# Patient Record
(demographics unavailable — no encounter records)

---

## 2024-11-20 NOTE — PROCEDURE
[FreeTextEntry6] : Nasal Endoscopy: Reason for nasal endoscopy: anterior rhinoscopy insufficient to account for symptoms.  Flexible scope #24 was used. Right nasal passage with normal inferior, middle and superior turbinates. Nasal passage patent with widely patent max antrostomy with quiescent mucosa. Well healed without any areas suspicious for IP. Clear sphenoethmoid recess. Ethmoids clear. Left nasal passage with normal inferior, middle and superior turbinates. Nasal passage was patent with tiny polyp at posterior middle meatus and sphenoethmoid recess.  Nasopharynx clear.

## 2024-11-20 NOTE — HISTORY OF PRESENT ILLNESS
[de-identified] : 71 y/o M s/p FESS and IP removal on 3/5/2024.  He notes doing well.  No bleeding or d/c from the nose.  No congestion.  Sense of smell is good.  Occasionally using sinus wash. had thyroid us which showed left thyroid nodule and biopsy 9/12 results THYROID, LEFT LOWER, US GUIDED FNA: BENIGN FINDINGS  (Category II).Consistent with a nodular goiter.

## 2024-11-20 NOTE — ASSESSMENT
[FreeTextEntry1] : S/p FESS and Removal of IP.  Well healed.  No evidence of IP on exam today: - post-resection baseline MRI looked clear although thyroid enlargement noted so thyroid us ordered and had left thyroid nodule with FNA that was benign - yearly thyroid us - F/U 3 months for endoscopic surveillance

## 2025-02-07 NOTE — PROCEDURE
[FreeTextEntry6] : Nasal Endoscopy: Reason for nasal endoscopy: anterior rhinoscopy insufficient to account for symptoms.  Flexible scope #24 was used. Right nasal passage with normal inferior, middle and superior turbinates. Nasal passage patent with widely patent with thick purulent mucus in inferior max antrostomy with quiescent mucosa. Well healed without any areas suspicious for IP. Clear sphenoethmoid recess. Ethmoids clear. Left nasal passage with normal inferior, middle and superior turbinates. Nasal passage was patent and clear at the middle meatus and sphenoethmoid recess.  Nasopharynx clear.  Rigid scope used to suction the purulence and culture taken.

## 2025-02-07 NOTE — HISTORY OF PRESENT ILLNESS
[de-identified] : 72 y/o M s/p FESS and IP removal on 3/5/2024.  He notes doing well.  No bleeding or d/c from the nose.  No congestion.  Sense of smell is good.  Occasionally using sinus wash. had thyroid us which showed left thyroid nodule and biopsy 9/12 results THYROID, LEFT LOWER, US GUIDED FNA: BENIGN FINDINGS  (Category II).Consistent with a nodular goiter.  [FreeTextEntry1] : He is here for f/u. He states he has been having to clear his nose a lot. He has been having nasal secretions. He has no nasal congestion. He has not been using any nasal saline rinses. He stats he had a cold last week.

## 2025-02-07 NOTE — END OF VISIT
[FreeTextEntry3] : I personally saw and examined Mr. MONIQUE POPE in detail this visit today. I personally reviewed the HPI, PMH, FH, SH, ROS and medications/allergies. I have spoken to ELDA Andersen regarding the history and have personally determined the assessment and plan of care, and documented this myself. I was present and participated in all key portions of the encounter and all procedures noted above. I have made changes in the body of the note where appropriate.   Attesting Faculty: See Attending Signature Below

## 2025-02-07 NOTE — ASSESSMENT
[FreeTextEntry1] : S/p FESS and Removal of IP.  Well healed.  No evidence of IP on exam today but has thick purulent secretion in the right maxillary Nasal Endoscopy shows right purulent secretions from the right maxillary  -Rx: Augmentin for 7 days -Nasal Culture sent-will call with results  - post-resection baseline MRI looked clear although thyroid enlargement noted so thyroid us ordered and had left thyroid nodule with FNA that was benign - yearly thyroid us due May 2025 - F/U 1 month

## 2025-04-01 NOTE — ASSESSMENT
[FreeTextEntry1] : S/p FESS and Removal of IP.  Well healed.  No evidence of IP but still with thick mucus in the right maxillary Nasal Endoscopy still with right thick mucus secretions from the right maxillary  -Rx: mupirocin to add to rinses  -Continue nasal saline rinses BID -Rx: Flonase one spray BID  - yearly thyroid us due May 2025 - F/U 1 month

## 2025-04-01 NOTE — HISTORY OF PRESENT ILLNESS
[de-identified] : 70 y/o M s/p FESS and IP removal on 3/5/2024.  He notes doing well.  No bleeding or d/c from the nose.  No congestion.  Sense of smell is good.  Occasionally using sinus wash. had thyroid us which showed left thyroid nodule and biopsy 9/12 results THYROID, LEFT LOWER, US GUIDED FNA: BENIGN FINDINGS  (Category II).Consistent with a nodular goiter.  [FreeTextEntry1] : He is here for f/u. He completed the course of the abx and is feeling good. His nasal congestion is better today and has no purulent secretions. He is breathing better through the nose. He has been using nasal saline rinses.

## 2025-04-01 NOTE — PROCEDURE
[FreeTextEntry6] : Nasal Endoscopy: Reason for nasal endoscopy: anterior rhinoscopy insufficient to account for symptoms.  Flexible scope #24 was used. Right nasal passage with normal inferior, middle and superior turbinates. Nasal passage patent with widely patent with thick mucus/crust in inferior max antrostomy with moderately edematous mucosa. Well healed without any areas suspicious for IP. Clear sphenoethmoid recess. Ethmoids clear. Left nasal passage with normal inferior, middle and superior turbinates. Nasal passage was patent and clear at the middle meatus and sphenoethmoid recess.  Nasopharynx clear.

## 2025-05-16 NOTE — HISTORY OF PRESENT ILLNESS
[de-identified] : 72 y/o M s/p FESS and IP removal on 3/5/2024.  He notes doing well.  No bleeding or d/c from the nose.  No congestion.  Sense of smell is good.  Occasionally using sinus wash. had thyroid us which showed left thyroid nodule and biopsy 9/12 results THYROID, LEFT LOWER, US GUIDED FNA: BENIGN FINDINGS  (Category II).Consistent with a nodular goiter.  [FreeTextEntry1] : He is here for f/u. He is feeling better. He has been using the mupirocin in the rinses and the flonase.  His nasal congestion is better today and has no purulent secretions. He is breathing better through the nose.  Pt denies any nasal drainage, PND, facial pain or pressure, runny nose or sinus infections

## 2025-05-16 NOTE — ASSESSMENT
[FreeTextEntry1] : S/p FESS and Removal of IP.  Well healed.  No evidence of IP but still with thick mucus in the right maxillary Nasal Endoscopy with no sign of any IP and no longer with infection, scope looks better -Continue nasal saline rinses 1-2x a day -Continue Flonase one spray once a day - yearly thyroid us due May 2025 script in  - F/U in 3 months

## 2025-05-16 NOTE — PROCEDURE
[FreeTextEntry6] : Nasal Endoscopy: Reason for nasal endoscopy: anterior rhinoscopy insufficient to account for symptoms.  Flexible scope #24 was used. Right nasal passage with normal inferior, middle and superior turbinates. Nasal passage patent with widely patent inferior max antrostomy. Small amount of mucus along the floor of the max sinus but does not appear infected. Well healed without any areas suspicious for IP. Clear sphenoethmoid recess. Ethmoids clear. Left nasal passage with normal inferior, middle and superior turbinates. Nasal passage was patent and clear at the middle meatus and sphenoethmoid recess.  Nasopharynx clear.